# Patient Record
Sex: FEMALE | Race: BLACK OR AFRICAN AMERICAN | NOT HISPANIC OR LATINO | URBAN - METROPOLITAN AREA
[De-identification: names, ages, dates, MRNs, and addresses within clinical notes are randomized per-mention and may not be internally consistent; named-entity substitution may affect disease eponyms.]

---

## 2023-11-24 ENCOUNTER — EMERGENCY (EMERGENCY)
Facility: HOSPITAL | Age: 33
LOS: 0 days | Discharge: HOME | End: 2023-11-24
Attending: STUDENT IN AN ORGANIZED HEALTH CARE EDUCATION/TRAINING PROGRAM
Payer: MEDICAID

## 2023-11-24 VITALS
RESPIRATION RATE: 18 BRPM | SYSTOLIC BLOOD PRESSURE: 118 MMHG | TEMPERATURE: 98 F | WEIGHT: 220.02 LBS | OXYGEN SATURATION: 100 % | HEIGHT: 62 IN | HEART RATE: 100 BPM | DIASTOLIC BLOOD PRESSURE: 67 MMHG

## 2023-11-24 DIAGNOSIS — M25.572 PAIN IN LEFT ANKLE AND JOINTS OF LEFT FOOT: ICD-10-CM

## 2023-11-24 DIAGNOSIS — S82.832A OTHER FRACTURE OF UPPER AND LOWER END OF LEFT FIBULA, INITIAL ENCOUNTER FOR CLOSED FRACTURE: ICD-10-CM
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
## 2023-11-24 DIAGNOSIS — M25.475 EFFUSION, LEFT FOOT: ICD-10-CM

## 2023-11-24 DIAGNOSIS — M25.472 EFFUSION, LEFT ANKLE: ICD-10-CM
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
## 2023-11-24 PROCEDURE — 73630 X-RAY EXAM OF FOOT: CPT | Mod: 26,LT

## 2023-11-24 PROCEDURE — 73590 X-RAY EXAM OF LOWER LEG: CPT | Mod: 26,LT

## 2023-11-24 PROCEDURE — 99284 EMERGENCY DEPT VISIT MOD MDM: CPT

## 2023-11-24 PROCEDURE — 73600 X-RAY EXAM OF ANKLE: CPT | Mod: 26,LT

## 2023-11-24 PROCEDURE — 73610 X-RAY EXAM OF ANKLE: CPT | Mod: 26,LT,76

## 2023-11-24 RX ORDER — ASPIRIN/CALCIUM CARB/MAGNESIUM 324 MG
162 TABLET ORAL ONCE
Refills: 0 | Status: COMPLETED | OUTPATIENT
Start: 2023-11-24 | End: 2023-11-24

## 2023-11-24 RX ORDER — ACETAMINOPHEN 500 MG
650 TABLET ORAL ONCE
Refills: 0 | Status: COMPLETED | OUTPATIENT
Start: 2023-11-24 | End: 2023-11-24

## 2023-11-24 RX ORDER — IBUPROFEN 200 MG
600 TABLET ORAL ONCE
Refills: 0 | Status: COMPLETED | OUTPATIENT
Start: 2023-11-24 | End: 2023-11-24

## 2023-11-24 RX ORDER — ASPIRIN/CALCIUM CARB/MAGNESIUM 324 MG
325 TABLET ORAL ONCE
Refills: 0 | Status: DISCONTINUED | OUTPATIENT
Start: 2023-11-24 | End: 2023-11-24

## 2023-11-24 RX ADMIN — Medication 600 MILLIGRAM(S): at 10:03

## 2023-11-24 RX ADMIN — Medication 650 MILLIGRAM(S): at 10:03

## 2023-11-24 RX ADMIN — Medication 162 MILLIGRAM(S): at 14:09

## 2023-11-24 NOTE — ED PROVIDER NOTE - NSFOLLOWUPINSTRUCTIONS_ED_ALL_ED_FT
You were seen today for Left foot and ankle pain/ swelling - you had xrays which showed     Your symptoms are likely due to ankle sprain injury. You should ice, elevate, rest the affected joint. For pain control you can try to following pain regimen:   Ibuprofen 600 mg every 6 hours as needed for pain with food and plenty of water for up to 5 days  Take tylenol 650 mg every 6 hours as needed for pain     Follow up with your primary doctor in 1-2 days  Follow up with an orthopedist further evaluation within 1 week    Return to the emergency department for severe pain, fever, vomiting, inability to walk, weakness/numbness, chest pain, shortness of breath or if you have any new or changing symptoms or concerns. You were seen today for Left foot and ankle pain/ swelling - you had xrays which showed Left lateral ankle fracture at distal fibula (Knox B fracture)   Take aspirin 325mg once daily to prevent clot for next 30 days        You should ice, elevate, rest the affected joint. For pain control you can try to following pain regimen:   Ibuprofen 400 mg every 8 hours as needed for pain with food and plenty of water for up to 5 days  Take tylenol 650 mg every 6 hours as needed for pain     Follow up with your primary doctor in 1-2 days  Follow up with an orthopedist further evaluation within 2 weeks or Follow up with Dr. Hernandez in office in 2 weeks. Please call office for appointment.     Do not put weight on the injured leg. Do not get splint wet. Use crutches to walk.       Return to the emergency department for severe pain, fever, vomiting, inability to walk, weakness/numbness, chest pain, shortness of breath or if you have any new or changing symptoms or concerns.

## 2023-11-24 NOTE — ED ADULT TRIAGE NOTE - CHIEF COMPLAINT QUOTE
pt states she fell and injured her left foot while playing with her son yesterday. c/o pain and swelling of left foot. no history.

## 2023-11-24 NOTE — CONSULT NOTE ADULT - SUBJECTIVE AND OBJECTIVE BOX
32y/o F presents to the ED c/o L ankle pain s/p inversion injury yesterday. Community ambulator w/o assistance at baseline. Pt states she inverted her L ankle while walking and noticed immediate pain and difficulty bearing weight to L ankle immediately after injury. No other injuries or complaints. Denies CP, SOB, numbness/tingling, weakness or any other complaints.     LABS:                VITAL SIGNS:  T(C): 36.9 (11-24-23 @ 08:56), Max: 36.9 (11-24-23 @ 08:56)  HR: 100 (11-24-23 @ 08:56) (100 - 100)  BP: 118/67 (11-24-23 @ 08:56) (118/67 - 118/67)  RR: 18 (11-24-23 @ 08:56) (18 - 18)  SpO2: 100% (11-24-23 @ 08:56) (100% - 100%)    PE:   LLE:   Skin intact  Moderate swelling of ankle noted  TTP over lateral aspect of ankle, otherwise NTTP elsewhere along extremity  ROM of ankle limited secondary to pain  + EHL/FHL/GS/TA  SILT Tib/SPN/DPN/Sural Saph  DP Palpable  Compartments soft and compressible  No Calf TTP    SECONDARY EXAM: Benign, Skin intact, SILT throughout, motor grossly intact throughout, no other orthopedic injuries at this time, compartments soft and compressible    SPINE: Skin intact, no bony tenderness or step-offs appreciated throughout cervical/thoracic/lumbar/sacral spine    BL UE: Skin intact, no erythema, ecchymosis, edema, gross deformity, NTTP over the bony prominences of the shoulder/elbow/wrist/hand, painless passive/active ROM of the shoulder/elbow/wrist/hand, C5-T1 SILT, motor grossly intact throughout axillary/musculocutaneous/radial/median/ulnar nerves, + radial pulse    RLE: Skin intact, no erythema, ecchymosis, edema, gross deformity, NTTP over the bony prominences of the hip/knee/ankle/foot, painless passive/active ROM of the hip/knee/ankle/foot, L2-S1 SILT, motor grossly intact throughout Hip Flexors/Quadriceps/Hamstrings/TA/EHL/FHL/GSC, + DP/PT pulses, no pain with log roll, no pain on axial loading, compartments soft and compressible, calf nontender     XR: L Knox B ankle fx  Stress view- no widening seen    AP: 32y/o F w/ L ankle fx s/p inversion injury yesterday.     LLE NWB in Trilam Splint w/ assistive devices as needed  Keep splint c/d/i  Rest/Ice/elevation  Pain control prn  Recommend [id number] Daily x30 days for DVT PPx  Ortho stable for dc  No further acute orthopedic surgical intervention  Follow up with Dr. Hernandez in office in 2 weeks. Please call office for appointment.   Discussed plan w/ Dr. Hernandez who agrees.

## 2023-11-24 NOTE — ED PROVIDER NOTE - CLINICAL SUMMARY MEDICAL DECISION MAKING FREE TEXT BOX
33F no pmhx who presents for evaluation of left foot and ankle swelling sp rolling ankle yday , states foot went backwards. Occurred last night. Took 2 motrin last night without relief of symptoms. Denies cp/ sob  - exam with L ankle and foot swelling with tenderness over ankle joint- ice, elevate, tx of pain with ibuprofen, acetaminophen. Patient denies pregnancy.

## 2023-11-24 NOTE — ED ADULT NURSE NOTE - NSFALLRISKINTERV_ED_ALL_ED
Assistance OOB with selected safe patient handling equipment if applicable/Assistance with ambulation/Communicate fall risk and risk factors to all staff, patient, and family/Monitor gait and stability/Provide visual cue: yellow wristband, yellow gown, etc/Reinforce activity limits and safety measures with patient and family/Call bell, personal items and telephone in reach/Instruct patient to call for assistance before getting out of bed/chair/stretcher/Non-slip footwear applied when patient is off stretcher/[place] to call system/Physically safe environment - no spills, clutter or unnecessary equipment/Purposeful Proactive Rounding/Room/bathroom lighting operational, light cord in reach

## 2023-11-24 NOTE — ED PROVIDER NOTE - PATIENT PORTAL LINK FT
You can access the FollowMyHealth Patient Portal offered by [facility] by registering at the following website: [email or web address]. By joining [employer]’s FollowMyHealth portal, you will also be able to view your health information using other applications (apps) compatible with our system.

## 2023-11-24 NOTE — ED ADULT NURSE NOTE - OBJECTIVE STATEMENT
33yF A&Ox4 presenting to ED with left foot pain s/p injury. pt reports she was playing with her son yesterday around 11am when she inadvertently landed on her left foot/ankle with the full amount of her weight and rolled/twisted the ankle. pt reports she has been unable to bare weight on the left foot and that her foot appears "swollen". pt denies all other pain at this time. no recent fevers/illness. NKDA.

## 2023-11-24 NOTE — ED PROVIDER NOTE - PHYSICAL EXAMINATION
Gen: AOX3, NAD  Head: NCAT  ENT: Airway patent, moist mucous membranes, nasal passageways clear  Cardiac: Normal rate  Respiratory: normal respirations and normal work of breathing   Gastrointestinal: Abdomen nondistended, central adiposity present   MSK: No gross abnormalities, FROM of all four extremities, + swelling L ankle joint with TTP over L medial and lateral malleoli, + mild ttp L lateral foot, cap refill < 2 sec, palpable pulses, extremities warm , no sensory deficits  Skin: No rashes, no lesions  Neuro: No gross neurologic deficits, normal speech, no gait abnormality

## 2023-11-24 NOTE — ED PROVIDER NOTE - ADDITIONAL NOTES AND INSTRUCTIONS:
Patient sustained ankle injury - which does not allow her to bear weight/ walk on it. Please allow light duty

## 2023-11-24 NOTE — ED PROVIDER NOTE - OBJECTIVE STATEMENT
33F no pmhx who presents for evaluation of left foot and ankle swelling sp rolling ankle yday , states foot went backwards. Occurred last night. Took 2 motrin last night without relief of symptoms. Denies cp/ sob

## 2023-11-24 NOTE — ED PROVIDER NOTE - CARE PROVIDER_API CALL
Nitin Hernandez  Orthopaedic Surgery  [address], [address]  [place] [address]  Phone: [phone]  Fax: [phone]  Follow Up Time: 4-6 Days